# Patient Record
Sex: FEMALE | Race: WHITE | NOT HISPANIC OR LATINO
[De-identification: names, ages, dates, MRNs, and addresses within clinical notes are randomized per-mention and may not be internally consistent; named-entity substitution may affect disease eponyms.]

---

## 2022-07-11 ENCOUNTER — APPOINTMENT (OUTPATIENT)
Dept: ORTHOPEDIC SURGERY | Facility: CLINIC | Age: 57
End: 2022-07-11

## 2022-07-11 VITALS — WEIGHT: 145 LBS | BODY MASS INDEX: 23.3 KG/M2 | HEIGHT: 66 IN

## 2022-07-11 DIAGNOSIS — M47.816 SPONDYLOSIS W/OUT MYELOPATHY OR RADICULOPATHY, LUMBAR REGION: ICD-10-CM

## 2022-07-11 PROBLEM — Z00.00 ENCOUNTER FOR PREVENTIVE HEALTH EXAMINATION: Status: ACTIVE | Noted: 2022-07-11

## 2022-07-11 PROCEDURE — 99214 OFFICE O/P EST MOD 30 MIN: CPT | Mod: 25

## 2022-07-11 PROCEDURE — 20610 DRAIN/INJ JOINT/BURSA W/O US: CPT

## 2022-07-11 NOTE — IMAGING
[de-identified] : Back, including spine: \par \par Palpation of the thoracic/lumbar spine is as follows: no lumbar paraspinal tenderness.  R\par GT/ITB TTP \par \par Range of motion of the thoracic and lumbar spine is as follows: Full range of motion with no pain. No\par pain with hip F/IR \par \par Strength Testing of the thoracic and lumbar spine is as follows: motor exam is 5/5l throughout\par both lower extremities \par \par Special testing of the thoracic and lumbar spine is as follows: Equivocal sitting straight leg raise on the right. \par \par Neurological testing of the thoracic and lumbar spine is as follows: sensory exam is non-focal throughout both lower extremities \par \par Gait and function is as follows: non-antalgic gait.

## 2022-07-11 NOTE — HISTORY OF PRESENT ILLNESS
[de-identified] : LBP worse on the Right, with associated N/T radiating in to the R foot and toes. \par 6/23/21 Sill LBP worse on the Right, with cramping in to the RLE in to the foot. A/w N/T in the foot and toes. Taking\par flexeril with some relief. NSAID with some relief though tough on her stomach.\par 8/6/21- LBP and pain to the RLE has subsided with PT, acupuncture, massage.\par 7/11/22- Continued LBP radiating to R lateral hip

## 2022-07-11 NOTE — ASSESSMENT
[FreeTextEntry1] : Has likely 2 issues, R GT TTP and bursitis, though separate radicular complaints distally\par Has failed conservative measures under my care since 6/23/21, including medications. \par Lumbar MRI to eval for HNP. \par \par Greater Trochanteric Bursitis- The risks, benefits, contents and alternatives to injection were explained in full to the patient.  Risks outlined include but are not limited to infection, bleeding, scarring, skin discoloration, temporary increase in pain, syncopal episode, failure to resolve symptoms, allergic reaction, flare reaction, permanent white skin discoloration, symptom recurrence, and elevation of blood sugar in diabetics.  Patient understood the risks.  All questions were answered.  After discussion of options, patient requested an injection.  Oral informed consent was obtained and the injection site was prepped in a sterile fashion. The mixture consisted of: \par \par 4 cc .25% Marcaine, 80 mg of Depomedrol \par \par  The right trochanteric bursa was palpated to determine the site of maximal tenderness overlying the greater trochanter. The needle was advanced to contact the greater trochanter. The needle was withdrawn 1-2 mm and after negative aspiration, medication was injected.  The patient tolerated the procedure without any complications.\par

## 2022-07-14 ENCOUNTER — FORM ENCOUNTER (OUTPATIENT)
Age: 57
End: 2022-07-14

## 2022-07-15 ENCOUNTER — APPOINTMENT (OUTPATIENT)
Dept: MRI IMAGING | Facility: CLINIC | Age: 57
End: 2022-07-15

## 2022-07-15 PROCEDURE — 72148 MRI LUMBAR SPINE W/O DYE: CPT

## 2022-08-16 ENCOUNTER — APPOINTMENT (OUTPATIENT)
Dept: ORTHOPEDIC SURGERY | Facility: CLINIC | Age: 57
End: 2022-08-16

## 2022-08-16 DIAGNOSIS — M70.61 TROCHANTERIC BURSITIS, RIGHT HIP: ICD-10-CM

## 2022-08-16 DIAGNOSIS — M54.16 RADICULOPATHY, LUMBAR REGION: ICD-10-CM

## 2022-08-16 PROCEDURE — 99214 OFFICE O/P EST MOD 30 MIN: CPT

## 2022-08-16 NOTE — HISTORY OF PRESENT ILLNESS
[Lower back] : lower back [9] : 9 [3] : 3 [de-identified] : L MRI 7/15/22\par Findings: Convex left curvature. Diffuse loss of disc signal and height L1-L2 through L3-L4. Nonacute \par avulsion injury at anterior superior endplate of L4 with no reactive marrow edema. No compression fracture. No \par spondylosis.\par T12-L1: No herniation, foraminal stenosis, or central stenosis.\par L1-L2: Loss of disc signal and height. Broad bulge, facet hypertrophy, and ligamentum flavum hypertrophy\par with no foraminal stenosis. Broad central and asymmetric to right herniation impressing on the thecal sac with \par mild right-sided canal stenosis. Posterior epidural lipomatosis.\par L2-L3: Broad bulge, facet hypertrophy, and ligamentum flavum hypertrophy with no foraminal stenosis. Broad \par asymmetric to right broad herniation impressing on the thecal sac with cranial migration posterior to L2, mild \par right-sided canal stenosis. Posterior epidural lipomatosis.\par L3-L4: Loss of disc signal. Broad bulge, facet hypertrophy, and ligamentum flavum hypertrophy with facet \par effusion. No foraminal stenosis. No herniation or central stenosis. Posterior epidural lipomatosis.\par L4-L5: Minor retrolisthesis. Broad bulge, facet hypertrophy, ligamentum flavum hypertrophy, and facet \par effusion with inferior right foraminal stenosis. No mass effect on exiting nerve roots. No central herniation or \par central stenosis. Posterior epidural lipomatosis.\par L5-S1: No herniation, foraminal stenosis, or central stenosis. Facet hypertrophy, ligamentum flavum \par hypertrophy, and left facet effusion. Epidural lipomatosis.\par Ind. review- \par Right L1/2, L2/3 HNPs\par \par --------------------------\par LBP worse on the Right, with associated N/T radiating in to the R foot and toes. \par 6/23/21 Sill LBP worse on the Right, with cramping in to the RLE in to the foot. A/w N/T in the foot and toes. Taking\par flexeril with some relief. NSAID with some relief though tough on her stomach.\par 8/6/21- LBP and pain to the RLE has subsided with PT, acupuncture, massage.\par 7/11/22- Continued LBP radiating to R lateral hip\par 8/16/22- symptoms unchanged, worse when seated, GT CSI with some relief last visit [de-identified] : MRI

## 2022-08-16 NOTE — IMAGING
[de-identified] : Back, including spine: \par \par Palpation of the thoracic/lumbar spine is as follows: no lumbar paraspinal tenderness.  R and L\par GT/ITB TTP \par \par Range of motion of the thoracic and lumbar spine is as follows: Full range of motion with no pain. No\par pain with hip F/IR \par \par Strength Testing of the thoracic and lumbar spine is as follows: motor exam is 5/5l throughout\par both lower extremities \par \par Special testing of the thoracic and lumbar spine is as follows: Equivocal sitting straight leg raise on the right. \par \par Neurological testing of the thoracic and lumbar spine is as follows: sensory exam is non-focal throughout both lower extremities \par \par Gait and function is as follows: non-antalgic gait.

## 2022-08-16 NOTE — ASSESSMENT
[FreeTextEntry1] : Has likely 2 issues, R GT TTP and bursitis, though separate radicular complaints\par Right L1/2, L2/3 HNPs\par PT, will discuss Pain\par

## 2022-11-08 ENCOUNTER — APPOINTMENT (OUTPATIENT)
Dept: ORTHOPEDIC SURGERY | Facility: CLINIC | Age: 57
End: 2022-11-08

## 2024-08-23 ENCOUNTER — OFFICE (OUTPATIENT)
Dept: URBAN - METROPOLITAN AREA CLINIC 104 | Facility: CLINIC | Age: 59
Setting detail: OPHTHALMOLOGY
End: 2024-08-23
Payer: COMMERCIAL

## 2024-08-23 DIAGNOSIS — H40.033: ICD-10-CM

## 2024-08-23 DIAGNOSIS — H01.002: ICD-10-CM

## 2024-08-23 DIAGNOSIS — H01.005: ICD-10-CM

## 2024-08-23 DIAGNOSIS — H01.004: ICD-10-CM

## 2024-08-23 DIAGNOSIS — H52.4: ICD-10-CM

## 2024-08-23 DIAGNOSIS — H25.13: ICD-10-CM

## 2024-08-23 DIAGNOSIS — H18.513: ICD-10-CM

## 2024-08-23 DIAGNOSIS — H01.001: ICD-10-CM

## 2024-08-23 PROBLEM — B30.9 VIRAL CONJUNCTIVITIS: Status: ACTIVE | Noted: 2024-08-23

## 2024-08-23 PROCEDURE — 92250 FUNDUS PHOTOGRAPHY W/I&R: CPT | Performed by: OPTOMETRIST

## 2024-08-23 PROCEDURE — 92004 COMPRE OPH EXAM NEW PT 1/>: CPT | Performed by: OPTOMETRIST

## 2024-08-23 PROCEDURE — 92015 DETERMINE REFRACTIVE STATE: CPT | Performed by: OPTOMETRIST

## 2024-08-23 ASSESSMENT — LID EXAM ASSESSMENTS
OS_BLEPHARITIS: LLL LUL 2+ 3+
OD_BLEPHARITIS: RLL RUL 2+ 3+

## 2024-08-23 ASSESSMENT — CONFRONTATIONAL VISUAL FIELD TEST (CVF)
OS_FINDINGS: FULL
OD_FINDINGS: FULL

## 2025-06-05 ENCOUNTER — OFFICE (OUTPATIENT)
Dept: URBAN - METROPOLITAN AREA CLINIC 104 | Facility: CLINIC | Age: 60
Setting detail: OPHTHALMOLOGY
End: 2025-06-05
Payer: COMMERCIAL

## 2025-06-05 DIAGNOSIS — H25.13: ICD-10-CM

## 2025-06-05 DIAGNOSIS — H01.001: ICD-10-CM

## 2025-06-05 DIAGNOSIS — H40.033: ICD-10-CM

## 2025-06-05 DIAGNOSIS — H01.004: ICD-10-CM

## 2025-06-05 DIAGNOSIS — H01.002: ICD-10-CM

## 2025-06-05 DIAGNOSIS — H10.45: ICD-10-CM

## 2025-06-05 DIAGNOSIS — H01.005: ICD-10-CM

## 2025-06-05 DIAGNOSIS — H18.513: ICD-10-CM

## 2025-06-05 PROCEDURE — 92133 CPTRZD OPH DX IMG PST SGM ON: CPT | Performed by: OPTOMETRIST

## 2025-06-05 PROCEDURE — 92083 EXTENDED VISUAL FIELD XM: CPT | Performed by: OPTOMETRIST

## 2025-06-05 PROCEDURE — 92012 INTRM OPH EXAM EST PATIENT: CPT | Performed by: OPTOMETRIST

## 2025-06-05 ASSESSMENT — REFRACTION_CURRENTRX
OS_OVR_VA: 20/
OD_OVR_VA: 20/
OS_SPHERE: +1.00
OD_SPHERE: +1.25

## 2025-06-05 ASSESSMENT — KERATOMETRY
OS_K2POWER_DIOPTERS: 43.55
OS_K1POWER_DIOPTERS: 43.05
OD_AXISANGLE_DEGREES: 091
OD_K1POWER_DIOPTERS: 42.40
OD_K2POWER_DIOPTERS: 43.10
OS_AXISANGLE_DEGREES: 120

## 2025-06-05 ASSESSMENT — REFRACTION_MANIFEST
OD_VA1: 20/20
OD_SPHERE: +1.00
OD_SPHERE: +0.50
OS_ADD: +2.00
OS_AXIS: 020
OS_VA1: 20/20
OD_AXIS: 180
OS_SPHERE: +1.00
OS_SPHERE: +0.75
OS_CYLINDER: -0.75
OD_ADD: +2.00
OD_CYLINDER: -0.50

## 2025-06-05 ASSESSMENT — REFRACTION_AUTOREFRACTION
OD_SPHERE: +0.50
OD_AXIS: 158
OS_AXIS: 016
OS_SPHERE: +1.75
OD_CYLINDER: -0.50
OS_CYLINDER: -1.00

## 2025-06-05 ASSESSMENT — VISUAL ACUITY
OS_BCVA: 20/20-1
OD_BCVA: 20/20-1

## 2025-06-05 ASSESSMENT — LID EXAM ASSESSMENTS
OS_BLEPHARITIS: LLL LUL 2+ 3+
OD_BLEPHARITIS: RLL RUL 2+ 3+

## 2025-06-05 ASSESSMENT — CORNEAL DYSTROPHY - POSTERIOR
OS_POSTERIORDYSTROPHY: T GUTTATA
OD_POSTERIORDYSTROPHY: T GUTTATA

## 2025-06-05 ASSESSMENT — CONFRONTATIONAL VISUAL FIELD TEST (CVF)
OD_FINDINGS: FULL
OS_FINDINGS: FULL

## 2025-06-18 ENCOUNTER — RX ONLY (RX ONLY)
Age: 60
End: 2025-06-18

## 2025-06-18 ENCOUNTER — OFFICE (OUTPATIENT)
Dept: URBAN - METROPOLITAN AREA CLINIC 104 | Facility: CLINIC | Age: 60
Setting detail: OPHTHALMOLOGY
End: 2025-06-18
Payer: COMMERCIAL

## 2025-06-18 DIAGNOSIS — H01.004: ICD-10-CM

## 2025-06-18 DIAGNOSIS — H25.13: ICD-10-CM

## 2025-06-18 DIAGNOSIS — H01.002: ICD-10-CM

## 2025-06-18 DIAGNOSIS — H40.033: ICD-10-CM

## 2025-06-18 DIAGNOSIS — H01.005: ICD-10-CM

## 2025-06-18 DIAGNOSIS — H01.001: ICD-10-CM

## 2025-06-18 DIAGNOSIS — H18.513: ICD-10-CM

## 2025-06-18 PROBLEM — H10.45 ALLERGIC CONJUNCTIVITIS: Status: RESOLVED | Noted: 2025-06-05 | Resolved: 2025-06-18

## 2025-06-18 PROCEDURE — 99213 OFFICE O/P EST LOW 20 MIN: CPT | Performed by: SPECIALIST

## 2025-06-18 PROCEDURE — 92020 GONIOSCOPY: CPT | Performed by: SPECIALIST

## 2025-06-18 ASSESSMENT — REFRACTION_CURRENTRX
OD_OVR_VA: 20/
OS_SPHERE: +1.00
OS_OVR_VA: 20/
OD_SPHERE: +1.25

## 2025-06-18 ASSESSMENT — REFRACTION_MANIFEST
OD_VA1: 20/20
OS_AXIS: 020
OD_SPHERE: +1.00
OS_ADD: +2.00
OD_AXIS: 180
OS_CYLINDER: -0.75
OD_ADD: +2.00
OS_VA1: 20/20
OD_SPHERE: +0.50
OS_SPHERE: +1.00
OD_CYLINDER: -0.50
OS_SPHERE: +0.75

## 2025-06-18 ASSESSMENT — VISUAL ACUITY
OD_BCVA: 20/25
OS_BCVA: 20/25

## 2025-06-18 ASSESSMENT — CORNEAL DYSTROPHY - POSTERIOR
OS_POSTERIORDYSTROPHY: T GUTTATA
OD_POSTERIORDYSTROPHY: T GUTTATA

## 2025-06-18 ASSESSMENT — REFRACTION_AUTOREFRACTION
OS_CYLINDER: -1.00
OS_SPHERE: +1.75
OS_AXIS: 016
OD_CYLINDER: -0.50
OD_AXIS: 158
OD_SPHERE: +0.50

## 2025-06-18 ASSESSMENT — KERATOMETRY
OS_K2POWER_DIOPTERS: 43.55
OS_K1POWER_DIOPTERS: 43.05
OD_K2POWER_DIOPTERS: 43.10
OD_K1POWER_DIOPTERS: 42.40
OS_AXISANGLE_DEGREES: 120
OD_AXISANGLE_DEGREES: 091

## 2025-06-18 ASSESSMENT — CONFRONTATIONAL VISUAL FIELD TEST (CVF)
OS_FINDINGS: FULL
OD_FINDINGS: FULL

## 2025-06-18 ASSESSMENT — LID EXAM ASSESSMENTS
OS_BLEPHARITIS: LLL LUL 2+ 3+
OD_BLEPHARITIS: RLL RUL 2+ 3+

## 2025-07-08 ENCOUNTER — APPOINTMENT (OUTPATIENT)
Dept: OBGYN | Facility: CLINIC | Age: 60
End: 2025-07-08
Payer: COMMERCIAL

## 2025-07-08 VITALS
HEIGHT: 66 IN | BODY MASS INDEX: 24.27 KG/M2 | WEIGHT: 151 LBS | DIASTOLIC BLOOD PRESSURE: 70 MMHG | SYSTOLIC BLOOD PRESSURE: 120 MMHG

## 2025-07-08 PROBLEM — Z01.419 WELL WOMAN EXAM WITH ROUTINE GYNECOLOGICAL EXAM: Status: ACTIVE | Noted: 2025-07-08

## 2025-07-08 PROCEDURE — 99386 PREV VISIT NEW AGE 40-64: CPT

## 2025-07-08 RX ORDER — ESTRADIOL 10 UG/1
10 TABLET VAGINAL
Qty: 35 | Refills: 3 | Status: ACTIVE | COMMUNITY
Start: 2025-07-08 | End: 1900-01-01

## 2025-07-10 LAB — HPV HIGH+LOW RISK DNA PNL CVX: NOT DETECTED

## 2025-07-14 LAB — CYTOLOGY CVX/VAG DOC THIN PREP: ABNORMAL

## 2025-08-05 ENCOUNTER — APPOINTMENT (OUTPATIENT)
Dept: OBGYN | Facility: CLINIC | Age: 60
End: 2025-08-05
Payer: COMMERCIAL

## 2025-08-05 VITALS
WEIGHT: 153 LBS | BODY MASS INDEX: 24.59 KG/M2 | HEIGHT: 66 IN | DIASTOLIC BLOOD PRESSURE: 76 MMHG | SYSTOLIC BLOOD PRESSURE: 116 MMHG

## 2025-08-05 DIAGNOSIS — R39.9 UNSPECIFIED SYMPTOMS AND SIGNS INVOLVING THE GENITOURINARY SYSTEM: ICD-10-CM

## 2025-08-05 DIAGNOSIS — N95.2 POSTMENOPAUSAL ATROPHIC VAGINITIS: ICD-10-CM

## 2025-08-05 DIAGNOSIS — N39.3 STRESS INCONTINENCE (FEMALE) (MALE): ICD-10-CM

## 2025-08-05 LAB
BILIRUB UR QL STRIP: NORMAL
GLUCOSE UR-MCNC: NORMAL
HCG UR QL: 0.2 EU/DL
HGB UR QL STRIP.AUTO: NORMAL
KETONES UR-MCNC: NORMAL
LEUKOCYTE ESTERASE UR QL STRIP: NORMAL
NITRITE UR QL STRIP: NORMAL
PH UR STRIP: 7
PROT UR STRIP-MCNC: NORMAL
SP GR UR STRIP: 1.01

## 2025-08-05 PROCEDURE — 99203 OFFICE O/P NEW LOW 30 MIN: CPT | Mod: 25

## 2025-08-05 PROCEDURE — 81003 URINALYSIS AUTO W/O SCOPE: CPT | Mod: QW

## 2025-08-11 PROBLEM — N39.3 SUI (STRESS URINARY INCONTINENCE, FEMALE): Status: ACTIVE | Noted: 2025-07-08

## 2025-08-11 PROBLEM — N95.2 ATROPHIC VAGINITIS: Status: ACTIVE | Noted: 2025-07-08

## 2025-08-11 PROBLEM — R39.9 UTI SYMPTOMS: Status: ACTIVE | Noted: 2025-08-11

## 2025-08-11 LAB — BACTERIA UR CULT: ABNORMAL

## 2025-08-11 RX ORDER — AMPICILLIN 500 MG/1
500 CAPSULE ORAL 3 TIMES DAILY
Qty: 21 | Refills: 0 | Status: ACTIVE | COMMUNITY
Start: 2025-08-11 | End: 1900-01-01

## 2025-08-13 ENCOUNTER — APPOINTMENT (OUTPATIENT)
Dept: OTOLARYNGOLOGY | Facility: CLINIC | Age: 60
End: 2025-08-13
Payer: COMMERCIAL

## 2025-08-13 DIAGNOSIS — H93.13 TINNITUS, BILATERAL: ICD-10-CM

## 2025-08-13 DIAGNOSIS — H91.93 UNSPECIFIED HEARING LOSS, BILATERAL: ICD-10-CM

## 2025-08-13 PROCEDURE — 99203 OFFICE O/P NEW LOW 30 MIN: CPT

## 2025-09-08 DIAGNOSIS — Z12.31 ENCOUNTER FOR SCREENING MAMMOGRAM FOR MALIGNANT NEOPLASM OF BREAST: ICD-10-CM

## 2025-09-08 DIAGNOSIS — Z13.820 ENCOUNTER FOR SCREENING FOR OSTEOPOROSIS: ICD-10-CM
